# Patient Record
Sex: FEMALE | Race: WHITE | NOT HISPANIC OR LATINO
[De-identification: names, ages, dates, MRNs, and addresses within clinical notes are randomized per-mention and may not be internally consistent; named-entity substitution may affect disease eponyms.]

---

## 2020-04-03 ENCOUNTER — TRANSCRIPTION ENCOUNTER (OUTPATIENT)
Age: 45
End: 2020-04-03

## 2020-10-01 PROBLEM — Z00.00 ENCOUNTER FOR PREVENTIVE HEALTH EXAMINATION: Status: ACTIVE | Noted: 2020-10-01

## 2020-10-05 ENCOUNTER — APPOINTMENT (OUTPATIENT)
Dept: OTOLARYNGOLOGY | Facility: CLINIC | Age: 45
End: 2020-10-05
Payer: COMMERCIAL

## 2020-10-05 VITALS
TEMPERATURE: 96.8 F | SYSTOLIC BLOOD PRESSURE: 141 MMHG | HEIGHT: 63 IN | DIASTOLIC BLOOD PRESSURE: 91 MMHG | BODY MASS INDEX: 41.82 KG/M2 | WEIGHT: 236 LBS

## 2020-10-05 DIAGNOSIS — H93.292 OTHER ABNORMAL AUDITORY PERCEPTIONS, LEFT EAR: ICD-10-CM

## 2020-10-05 PROCEDURE — 99204 OFFICE O/P NEW MOD 45 MIN: CPT | Mod: 25

## 2020-10-05 PROCEDURE — 92570 ACOUSTIC IMMITANCE TESTING: CPT

## 2020-10-05 PROCEDURE — 92557 COMPREHENSIVE HEARING TEST: CPT

## 2020-10-05 RX ORDER — FLUTICASONE PROPIONATE 50 UG/1
50 SPRAY, METERED NASAL
Qty: 1 | Refills: 5 | Status: ACTIVE | COMMUNITY
Start: 2020-10-05 | End: 1900-01-01

## 2020-10-05 NOTE — REVIEW OF SYSTEMS
[Sneezing] : sneezing [Seasonal Allergies] : seasonal allergies [Post Nasal Drip] : post nasal drip [Ear Noises] : ear noises [Nasal Congestion] : nasal congestion [Problem Snoring] : problem snoring [Sinus Pressure] : sinus pressure [Wheezing] : wheezing [Cough] : cough [Throat Clearing] : throat clearing [Negative] : Ear

## 2020-10-05 NOTE — HISTORY OF PRESENT ILLNESS
[de-identified] : 45 yr old female c/o snoring.  Family has told her she gasps and chokes.  Had a sleep study 10 yrs ago, +GOGO, unable to eduardo mask for CPAP trial. Never treated.\par c/o PND, mucous or dry and a cough.  more relief from claritin than flonase\par never had allergy eval\par denies GERD, heartburn\par 5'3"\par 236 lbs (30lb weight gain this year)\par +sensation of hearing loss AS for many years\par -tinnitus, dizzy\par +childhood otitis s/p BMT\par -noise exp, head trauma, FH

## 2020-10-05 NOTE — ASSESSMENT
[FreeTextEntry1] :   WNL w type A AU\par \par sleep study\par weight loss\par follow up after study is complete\par

## 2020-10-05 NOTE — PHYSICAL EXAM
[Normal] : mucosa is normal [FreeTextEntry1] : obese [de-identified] : enlarged [de-identified] : large [de-identified] : 2-3+

## 2020-12-01 ENCOUNTER — APPOINTMENT (OUTPATIENT)
Dept: OTOLARYNGOLOGY | Facility: CLINIC | Age: 45
End: 2020-12-01

## 2021-01-25 ENCOUNTER — APPOINTMENT (OUTPATIENT)
Dept: OTOLARYNGOLOGY | Facility: CLINIC | Age: 46
End: 2021-01-25
Payer: COMMERCIAL

## 2021-01-25 VITALS
TEMPERATURE: 97.5 F | DIASTOLIC BLOOD PRESSURE: 90 MMHG | HEIGHT: 63 IN | WEIGHT: 250 LBS | SYSTOLIC BLOOD PRESSURE: 146 MMHG | BODY MASS INDEX: 44.3 KG/M2

## 2021-01-25 PROCEDURE — 99213 OFFICE O/P EST LOW 20 MIN: CPT

## 2021-01-25 PROCEDURE — 99072 ADDL SUPL MATRL&STAF TM PHE: CPT

## 2021-01-25 NOTE — PHYSICAL EXAM
[Normal] : mucosa is normal [Midline] : trachea located in midline position [de-identified] : 2 PLUS TOSIL/ MALAMPATI CLASS3

## 2021-01-25 NOTE — REVIEW OF SYSTEMS
[Seasonal Allergies] : seasonal allergies [Nasal Congestion] : nasal congestion [Chills] : chills [Patient Intake Form Reviewed] : Patient intake form was reviewed [FreeTextEntry9] : joint aches

## 2021-01-25 NOTE — ASSESSMENT
[FreeTextEntry1] : SLEEP STUDY REVIEWED\par SEVER SLEEP APNEA\par CPAP\par WEIGHT LOSS\par F/U AFTER ABOVE\par UPPP AND TONSILLECTOMY DISCUSSED

## 2021-06-23 ENCOUNTER — APPOINTMENT (OUTPATIENT)
Dept: OTOLARYNGOLOGY | Facility: CLINIC | Age: 46
End: 2021-06-23
Payer: COMMERCIAL

## 2021-06-23 VITALS
BODY MASS INDEX: 42.52 KG/M2 | HEART RATE: 80 BPM | TEMPERATURE: 97.3 F | SYSTOLIC BLOOD PRESSURE: 129 MMHG | HEIGHT: 63 IN | WEIGHT: 240 LBS | DIASTOLIC BLOOD PRESSURE: 84 MMHG

## 2021-06-23 DIAGNOSIS — J34.2 DEVIATED NASAL SEPTUM: ICD-10-CM

## 2021-06-23 DIAGNOSIS — R05 COUGH: ICD-10-CM

## 2021-06-23 DIAGNOSIS — G47.33 OBSTRUCTIVE SLEEP APNEA (ADULT) (PEDIATRIC): ICD-10-CM

## 2021-06-23 DIAGNOSIS — J35.1 HYPERTROPHY OF TONSILS: ICD-10-CM

## 2021-06-23 DIAGNOSIS — J31.0 CHRONIC RHINITIS: ICD-10-CM

## 2021-06-23 DIAGNOSIS — E66.9 OBESITY, UNSPECIFIED: ICD-10-CM

## 2021-06-23 PROCEDURE — 31575 DIAGNOSTIC LARYNGOSCOPY: CPT

## 2021-06-23 PROCEDURE — 99215 OFFICE O/P EST HI 40 MIN: CPT | Mod: 25

## 2021-06-23 RX ORDER — ESCITALOPRAM OXALATE 5 MG/1
TABLET, FILM COATED ORAL
Refills: 0 | Status: ACTIVE | COMMUNITY

## 2021-06-23 RX ORDER — AZELASTINE HYDROCHLORIDE 137 UG/1
0.1 SPRAY, METERED NASAL TWICE DAILY
Qty: 1 | Refills: 5 | Status: COMPLETED | COMMUNITY
Start: 2020-10-05 | End: 2021-06-23

## 2021-07-08 NOTE — HISTORY OF PRESENT ILLNESS
[de-identified] : Patient presents with h/o snoring, obesity, sleep apnea. \par \par The patient presents today with FU on CPAP. She states that she doesn't have trouble when sleeping while sitting up on her couch nor does she snore.

## 2021-07-08 NOTE — ADDENDUM
[FreeTextEntry1] : Documented by Isidoro Orosco acting as scribe for Dr. Estrella on 06/23/2021.\par \par All Medical record entries made by the Scribe were at my, Dr. Estrella, direction and personally dictated by me on 06/23/2021. I have reviewed the chart and agree that the record accurately reflects my personal performance of the history, physical exam, assessment and plan. I have also personally directed, reviewed, and agreed with the discharge instructions.

## 2021-07-08 NOTE — PROCEDURE
[de-identified] : Procedure:  Flexible Fiberoptic Laryngoscopy: Risks, benefits, and alternatives of flexible endoscopy were explained to the patient.  The patient gave oral consent to proceed.  The flexible scope was inserted into the right nasal cavity and advanced towards the nasopharynx.  Visualized mucosa over the turbinates and septum were as described above.  The nasopharynx was clear.  Oropharyngeal walls were symmetric and mobile without lesion, mass, or edema.  Hypopharynx was also without  lesion or edema.  Larynx was mobile without lesions. Supraglottic structures were free of edema, mass, and asymmetry.  True vocal folds were white without mass or lesion.  Base of tongue was within normal limits.\par  \par Left side open. Narrowing but no obstruction in soft palette

## 2021-07-08 NOTE — PHYSICAL EXAM
[] : septum deviated to the right [FreeTextEntry1] : Tonsils class 2 and lateral. Type 1 oral cavity.

## 2021-07-08 NOTE — ASSESSMENT
[FreeTextEntry1] : Reviewed and reconciled medications, allergies, PMHx, PSHx, SocHx, FMHx.\par \par h/o snoring, obesity, sleep apnea. \par Deviated septum to right\par Tonsils class 2 and lateral. Type 1 oral cavity. \par Flexible laryngoscopy - Left side open. Narrowing but no obstruction in soft palette\par \par CPAP:\par 11/21- 197 apnea. AHI of 108.1. Severe sleep apnea. \par 11/22 - \par 11/23 - \par Oxygen levels safe most nights\par Machine can't read as low as her oxygen levels go.\par Compliance report on 2/4-5/4- non-compliant\par \par Plan\par Need sleep endoscopy. Advised pt to lose weight. Discussed risks and benefits of tonsillectomy and electroturbinectomy. Start with oral appliance. . \par \par FU after treatment and tests.

## 2023-06-02 ENCOUNTER — APPOINTMENT (OUTPATIENT)
Dept: ORTHOPEDIC SURGERY | Facility: CLINIC | Age: 48
End: 2023-06-02
Payer: COMMERCIAL

## 2023-06-02 ENCOUNTER — NON-APPOINTMENT (OUTPATIENT)
Age: 48
End: 2023-06-02

## 2023-06-02 DIAGNOSIS — S69.91XD UNSPECIFIED INJURY OF RIGHT WRIST, HAND AND FINGER(S), SUBSEQUENT ENCOUNTER: ICD-10-CM

## 2023-06-02 PROCEDURE — 99204 OFFICE O/P NEW MOD 45 MIN: CPT

## 2023-06-06 ENCOUNTER — TRANSCRIPTION ENCOUNTER (OUTPATIENT)
Age: 48
End: 2023-06-06

## 2023-06-07 ENCOUNTER — APPOINTMENT (OUTPATIENT)
Dept: MRI IMAGING | Facility: CLINIC | Age: 48
End: 2023-06-07
Payer: COMMERCIAL

## 2023-06-07 PROCEDURE — 73218 MRI UPPER EXTREMITY W/O DYE: CPT | Mod: RT

## 2023-06-13 RX ORDER — MELOXICAM 15 MG/1
15 TABLET ORAL
Qty: 30 | Refills: 11 | Status: ACTIVE | COMMUNITY
Start: 2023-06-13 | End: 1900-01-01

## 2023-06-20 ENCOUNTER — APPOINTMENT (OUTPATIENT)
Dept: ORTHOPEDIC SURGERY | Facility: CLINIC | Age: 48
End: 2023-06-20